# Patient Record
Sex: FEMALE | Race: WHITE | NOT HISPANIC OR LATINO | Employment: UNEMPLOYED | URBAN - METROPOLITAN AREA
[De-identification: names, ages, dates, MRNs, and addresses within clinical notes are randomized per-mention and may not be internally consistent; named-entity substitution may affect disease eponyms.]

---

## 2017-01-03 ENCOUNTER — TELEPHONE (OUTPATIENT)
Dept: MEDICAL GROUP | Facility: MEDICAL CENTER | Age: 47
End: 2017-01-03

## 2017-01-03 DIAGNOSIS — R92.8 ABNORMAL MAMMOGRAM: ICD-10-CM

## 2017-01-03 NOTE — TELEPHONE ENCOUNTER
Phone Number Called: [phone] (home)     Message: called number on file, no answer and mailbox is full.     Left Message for patient to call back: no

## 2017-01-03 NOTE — TELEPHONE ENCOUNTER
Please notify patient. There is a new order for ultrasound of the breast for further evaluation of the left breast as per mammogram result. No red flags

## 2017-01-18 ENCOUNTER — HOSPITAL ENCOUNTER (OUTPATIENT)
Dept: RADIOLOGY | Facility: MEDICAL CENTER | Age: 47
End: 2017-01-18
Attending: FAMILY MEDICINE
Payer: MEDICAID

## 2017-01-18 DIAGNOSIS — R92.8 ABNORMAL MAMMOGRAM: ICD-10-CM

## 2017-01-18 PROCEDURE — G0206 DX MAMMO INCL CAD UNI: HCPCS | Mod: LT

## 2017-01-18 PROCEDURE — 76642 ULTRASOUND BREAST LIMITED: CPT | Mod: LT

## 2017-02-02 ENCOUNTER — OFFICE VISIT (OUTPATIENT)
Dept: NEUROLOGY | Facility: MEDICAL CENTER | Age: 47
End: 2017-02-02
Payer: MEDICAID

## 2017-02-02 VITALS
HEART RATE: 85 BPM | OXYGEN SATURATION: 97 % | BODY MASS INDEX: 27.05 KG/M2 | WEIGHT: 147 LBS | RESPIRATION RATE: 16 BRPM | DIASTOLIC BLOOD PRESSURE: 72 MMHG | TEMPERATURE: 98.6 F | SYSTOLIC BLOOD PRESSURE: 108 MMHG | HEIGHT: 62 IN

## 2017-02-02 DIAGNOSIS — R42 VERTIGO: ICD-10-CM

## 2017-02-02 PROCEDURE — 99243 OFF/OP CNSLTJ NEW/EST LOW 30: CPT

## 2017-02-02 RX ORDER — ALPRAZOLAM 1 MG/1
1 TABLET ORAL
Qty: 2 TAB | Refills: 0 | Status: SHIPPED | OUTPATIENT
Start: 2017-02-02 | End: 2017-05-18

## 2017-02-02 NOTE — MR AVS SNAPSHOT
"Nubia Stapleton   2017 11:00 AM   Office Visit   MRN: [id number]    Department:  [facility]   Dept Phone:  [phone]    Description:  Female : 1970   Provider:  Karthikeyan Rose M.D.           Reason for Visit     New Patient dizziness and BUE numbness       Allergies as of 2017     Allergen Noted Reactions    Dilantin [Alc-Fd&C Yellow #6-Na Benzoate-Phenytoin] 2017   Hives    Dilaudid [Hydromorphone Hcl] 2010       Flagyl [Metronidazole] 2015       Pcn [Penicillins] 06/15/2009       Zoloft 2016       Heart palpitations      You were diagnosed with     Vertigo   [[id number]]         Vital Signs     Blood Pressure Pulse Temperature Respirations Height Weight    108/72 mmHg 85 37 °C (98.6 °F) 16 1.575 m (5' 2\") 66.679 kg (147 lb)    Body Mass Index Oxygen Saturation Last Menstrual Period Smoking Status          26.88 kg/m2 97% 2015 Former Smoker        Basic Information     Date Of Birth Sex Race Ethnicity Preferred Language    1970 Female White Non- English      Your appointments     2017  3:20 PM   Follow Up Visit with Karthikeyan Rose M.D.   [facility] (--)    [address] [place] [address], [address]  [facility] [id number] [phone]           You will be receiving a confirmation call a few days before your appointment from our automated call confirmation system.              Problem List              ICD-10-CM Priority Class Noted - Resolved    Left shoulder pain M25.512   2015 - Present    Vaginal discharge N89.8   2015 - Present    Absolute anemia (Chronic) D64.9   2015 - Present    Other injury of muscle(s) and tendon(s) of the rotator cuff of left shoulder, initial encounter S46.092A   2015 - Present    Shoulder pain, left M25.512   2015 - Present    Chronic low back pain M54.5, G89.29   2015 - Present    Anxiety disorder (Chronic) F41.9   10/1/2015 - Present    Depression (Chronic) F32.9  "  10/1/2015 - Present    Leukopenia (Chronic) D72.819   10/29/2015 - Present    Cystocele, midline N81.11   1/19/2016 - Present    Coitalgia [id number]   1/19/2016 - Present    Vaginal pain R10.2   1/19/2016 - Present    Abnormal menses N92.6   1/19/2016 - Present    Female genuine stress incontinence N39.3   1/19/2016 - Present    Status post hysterectomy Z90.710   4/19/2016 - Present    Chest pain R07.9 High  11/26/2016 - Present    Vertigo (Chronic) R42   11/27/2016 - Present    Dizziness and giddiness R42   12/8/2016 - Present    Hyperlipidemia E78.5   12/8/2016 - Present      Health Maintenance        Date Due Completion Dates    IMM DTaP/Tdap/Td Vaccine (1 - Tdap) 3/26/1988 ---    IMM INFLUENZA (1) 9/1/2016 ---    MAMMOGRAM 1/18/2018 1/18/2017, 12/30/2016, 6/1/2015, 5/27/2015    PAP SMEAR 6/19/2018 6/19/2015            Current Immunizations     No immunizations on file.      Below and/or attached are the medications your provider expects you to take. Review all of your home medications and newly ordered medications with your provider and/or pharmacist. Follow medication instructions as directed by your provider and/or pharmacist. Please keep your medication list with you and share with your provider. Update the information when medications are discontinued, doses are changed, or new medications (including over-the-counter products) are added; and carry medication information at all times in the event of emergency situations     Allergies:  DILANTIN - Hives     DILAUDID - (reactions not documented)     FLAGYL - (reactions not documented)     PCN - (reactions not documented)     ZOLOFT - (reactions not documented)               Medications  Valid as of: February 02, 2017 -  1:15 PM    Generic Name Brand Name Tablet Size Instructions for use    ALPRAZolam (Tab) XANAX 1 MG Take 1 Tab by mouth 1 time daily as needed for Sleep.        .                 Medicines prescribed today were sent to:     [id number] -  [place], [place] - [address]    [address] [place] [address]    Phone: [phone] Fax: [phone]    Open 24 Hours?: No      Medication refill instructions:       If your prescription bottle indicates you have medication refills left, it is not necessary to call your provider’s office. Please contact your pharmacy and they will refill your medication.    If your prescription bottle indicates you do not have any refills left, you may request refills at any time through one of the following ways: The online [employer] system (except Urgent Care), by calling your provider’s office, or by asking your pharmacy to contact your provider’s office with a refill request. Medication refills are processed only during regular business hours and may not be available until the next business day. Your provider may request additional information or to have a follow-up visit with you prior to refilling your medication.   *Please Note: Medication refills are assigned a new Rx number when refilled electronically. Your pharmacy may indicate that no refills were authorized even though a new prescription for the same medication is available at the pharmacy. Please request the medicine by name with the pharmacy before contacting your provider for a refill.        Your To Do List     Future Labs/Procedures Complete By Expires    MR-BRAIN IAC'S W/WO  As directed 2/2/2018      Referral     A referral request has been sent to our patient care coordination department. Please allow 3-5 business days for us to process this request and contact you either by phone or mail. If you do not hear from us by the 5th business day, please call us at [phone].        Other Notes About Your Plan     Fall risk done 5/28/15           [employer] Access Code: Activation code not generated  Current [employer] Status: Active

## 2017-02-02 NOTE — PROGRESS NOTES
Neurology Consult Note  2/2/2017      Referring MD:  Dr. Sabas Coughlin    Patient ID:  Name:             Nubia Stapleton   YOB: 1970  Age:                 46 y.o.  female   MRN:               [id number]                                              Reason for Consult:      Evaluation of vertigo and off-balance sensation.    History of Present Illness:    This 46-year-old lady has had persistent problem with off-balance sensation particularly when she rolls over in bed at night and this makes her nauseous at times. She has had a history of what sounds like migraine headache in the past but it was not associated with vertigo but was associated with some nausea. The headaches would occur every couple of months and were apparently initially denies diagnosed as cluster headaches though in fact they were bitemporal and associated with perhaps some photophobia and phonophobia. She's had an MRI without contrast in the past has been unremarkable but apparently has a family member who had an normal noncontrast MRI and was subsequently discovered to have at least metastatic tumors. She does have a history of anxiety disorder and depression and is status post hysterectomy. She is on no medications but has tried meclizine in the past with some excessive sedation.    Review of Systems: Relates to her vertigo.  Constitutional: Denies fevers, Denies weight changes  Eyes: Denies changes in vision, no eye pain  Ears/Nose/Throat/Mouth: Denies nasal congestion or sore throat   Cardiovascular: Denies chest pain, Denies palpitations   Respiratory: Denies shortness of breath , Denies cough  Gastrointestinal/Hepatic: Denies abdominal pain, nausea, vomiting, diarrhea, constipation or GI bleeding   Genitourinary: Denies dysuria or frequency  Musculoskeletal/Rheum: Denies  joint pain and swelling, No edema  Skin: Denies rash  Neurological: Vertigo and occasional migraine headaches.  Psychiatric: denies mood disorder    Endocrine: Carey thyroid problems  Heme/Oncology/Lymph Nodes: Denies enlarged lymph nodes, denies brusing or known bleeding disorder  All other systems were reviewed and are negative (AMA/CMS criteria)                Past Medical History:     Past Medical History   Diagnosis Date   • Psychiatric disorder      anxiety, PTSD,depression   • Bronchitis    • Gynecological disorder    • Heart burn    • Indigestion    • Bowel habit changes    • Dental disorder    • Snoring        Problems addressed this visit:    Problem List Items Addressed This Visit     Vertigo (Chronic)    Relevant Medications    alprazolam (XANAX) 1 MG Tab    Other Relevant Orders    MR-BRAIN IAC'S W/WO    REFERRAL TO AUDIOLOGY          Past Surgical History:   Past Surgical History   Procedure Laterality Date   • [facility]  delivery ser     • Tonsillectomy and adenoidectomy     • Other       tube in ears   • Vaginal hysterectomy scope total N/A 2016     Procedure: VAGINAL HYSTERECTOMY SCOPE TOTAL with BSO ;  Surgeon: Zeke Almeida M.D.;  Location: SURGERY SAME DAY [facility] ORS;  Service:    • Anterior and posterior repair N/A 2016     Procedure: ANTERIOR AND POSTERIOR REPAIR with entrocele repair ;  Surgeon: Zeke Almeida M.D.;  Location: SURGERY SAME DAY [facility] ORS;  Service:    • Vaginal suspension N/A 2016     Procedure: VAGINAL SUSPENSION Sacrospinous , perineoplasty ;  Surgeon: Zeke Almeida M.D.;  Location: SURGERY SAME DAY [facility] ORS;  Service:    • Bladder sling female N/A 2016     Procedure: BLADDER SLING FEMALE TOT ;  Surgeon: Zeke Almeida M.D.;  Location: SURGERY SAME DAY [facility] ORS;  Service:          Current Outpatient Medications:  Current Outpatient Prescriptions   Medication   • alprazolam (XANAX) 1 MG Tab     No current facility-administered medications for this visit.       Medication Allergy:  Allergies   Allergen Reactions   • Dilantin [Alc-Fd&C Yellow #6-Na Benzoate-Phenytoin] Hives  "  • Dilaudid [Hydromorphone Hcl]    • Flagyl [Metronidazole]    • Pcn [Penicillins]    • Zoloft      Heart palpitations       Family History:  Family History   Problem Relation Age of Onset   • Heart Disease Mother    • Hyperlipidemia Mother    • Alcohol/Drug Father    • Cancer Maternal Uncle      COLON cANCER    • Cancer Maternal Grandmother      BREAST CANCER    • Lung Disease Paternal Grandfather    • Cancer Paternal Grandfather      CANCER    • Cancer Paternal Grandmother      pancreatic cancer       Social History:  Social History     Social History   • Marital Status: Legally      Spouse Name: N/A   • Number of Children: N/A   • Years of Education: N/A     Occupational History   • Not on file.     Social History Main Topics   • Smoking status: Former Smoker   • Smokeless tobacco: Never Used   • Alcohol Use: 0.0 oz/week     0 Standard drinks or equivalent per week      Comment: SOCIAL dRINK ONCE IN a WHILE    • Drug Use: No   • Sexual Activity:     Partners: Male     Other Topics Concern   • Not on file     Social History Narrative       Physical Exam: She is a well-developed well-nourished woman who appears her stated age. Examination ears reveals normal tympanic membranes. He does appear to have a bit of post head shake nystagmus particularly to the right and on Epley maneuver is vertiginous with either ear down. Did appear to be some nystagmus when she was in her right ear down position. Strengthening of her lower extremities is normal her hearing appears to be normal reflexes are symmetric finger-nose-finger heel-to-shin testing is done well. There is no sensory deficits and position sense is intact.  Vitals:   Filed Vitals:    02/02/17 1054   BP: 108/72   Pulse: 85   Temp: 37 °C (98.6 °F)   Resp: 16   Height: 1.575 m (5' 2\")   Weight: 66.679 kg (147 lb)   SpO2: 97%     General Appearance:   Weight/BMI: Body mass index is 26.88 kg/(m^2).    Neurologic Exam:   AOx3: Normal  Recent & remote memory " intact: Yes  Attentive with normal coordination: Yes  Normal spontaneous speech pattern: Yes  Age appropriate fund of knowledge: Yes  Cranial nerve II intact: Normal  Cranial nerve III, IV & VI intact: Normal  Cranial nerve V intact: Normal  Cranial nerve VIII intact: Normal  Cranial nerve IX intact: Normal  Cranial nerve XI intact: Normal  Cranial nerve XII intact: Normal  No sensory deficits: Normal  DTRs intact & symmetrical: Normal   No dysdiadochokinesia or dysmetria: Normal    Eyes:  Normal optic discs: Yes  Visual field: Normal  Pupillary responses: Normal  Extraocular movement: Normal    Cardiovascular:  Normal carotid pulses bilaterally: Yes  RRR with no MRGs: Yes  No peripheral edema, pulses intact: Yes    Musculoskeletal:  Normal gait and station: Yes  Normal muscle strength: Yes  Normal muscle tone, no atrophy: Yes  No abnormal movements: Yes    Plan:   I'm going to get an contrast enhanced brain MRI with attention to the internal auditory canals. I will also arrange for an electronystagmogram done and see her back after that. I know she has had an attempted appointment with ear nose and throat but had she has not been contacted by them.    Total length of time of this visit was 40 minutes of which greater than 50% was spent counseling the patient/family and coordinating care.    Thank you for allowing me to participate in his care.    Sincerely yours,        Karthikeyan Rose MD, PhD

## 2017-02-16 DIAGNOSIS — R42 VERTIGO, INTERMITTENT: ICD-10-CM

## 2017-02-27 ENCOUNTER — HOSPITAL ENCOUNTER (OUTPATIENT)
Dept: RADIOLOGY | Facility: MEDICAL CENTER | Age: 47
End: 2017-02-27
Payer: MEDICAID

## 2017-02-27 DIAGNOSIS — R42 VERTIGO: ICD-10-CM

## 2017-02-27 PROCEDURE — 700117 HCHG RX CONTRAST REV CODE 255

## 2017-02-27 PROCEDURE — A9579 GAD-BASE MR CONTRAST NOS,1ML: HCPCS

## 2017-02-27 PROCEDURE — 70553 MRI BRAIN STEM W/O & W/DYE: CPT

## 2017-02-27 RX ADMIN — GADODIAMIDE 15 ML: 287 INJECTION INTRAVENOUS at 16:45

## 2017-03-08 DIAGNOSIS — R42 VERTIGO: ICD-10-CM

## 2017-05-18 ENCOUNTER — OFFICE VISIT (OUTPATIENT)
Dept: MEDICAL GROUP | Facility: MEDICAL CENTER | Age: 47
End: 2017-05-18
Attending: FAMILY MEDICINE
Payer: MEDICAID

## 2017-05-18 VITALS
HEART RATE: 84 BPM | RESPIRATION RATE: 16 BRPM | OXYGEN SATURATION: 98 % | WEIGHT: 141 LBS | TEMPERATURE: 98.2 F | HEIGHT: 62 IN | SYSTOLIC BLOOD PRESSURE: 110 MMHG | DIASTOLIC BLOOD PRESSURE: 82 MMHG | BODY MASS INDEX: 25.95 KG/M2

## 2017-05-18 DIAGNOSIS — H02.89: ICD-10-CM

## 2017-05-18 DIAGNOSIS — F41.9 ANXIETY: ICD-10-CM

## 2017-05-18 DIAGNOSIS — Z87.09 HISTORY OF ASTHMA: ICD-10-CM

## 2017-05-18 DIAGNOSIS — E07.9 THYROID MASS: ICD-10-CM

## 2017-05-18 DIAGNOSIS — R42 DIZZINESS AND GIDDINESS: ICD-10-CM

## 2017-05-18 PROCEDURE — 99212 OFFICE O/P EST SF 10 MIN: CPT | Performed by: FAMILY MEDICINE

## 2017-05-18 PROCEDURE — 99214 OFFICE O/P EST MOD 30 MIN: CPT | Performed by: FAMILY MEDICINE

## 2017-05-18 RX ORDER — ALBUTEROL SULFATE 90 UG/1
2 AEROSOL, METERED RESPIRATORY (INHALATION) EVERY 6 HOURS PRN
Qty: 1 G | Refills: 5 | Status: SHIPPED | OUTPATIENT
Start: 2017-05-18

## 2017-05-18 NOTE — PROGRESS NOTES
Chief Complaint:   Chief Complaint   Patient presents with   • Follow-Up       HPI: Established patient  Nubia Stapleton is a 47 y.o. female who presents for  follow-up on medical problems. Discussed the following concerns as follows today:     Dizziness and giddiness     This chronic dizziness and vertigo has been evaluated by ENT and neurologist, reviewed records from neurology, recent MRI results, which shows no significant abnormality and rule out any tumor or internal ear mass or tumor, patients that she continues to have the dizziness, has another appointment to follow-up with ENT and neurology. Denies deterioration or worsening of symptoms, but she said it's not resolved, she is also having an appointment for physical therapy for further management  Eyelid pain of both eyes reports having this pain in both eyes. The back of the eye for at least couple of months now, she said she has been noticing it more often, not associated with any burning sensation, there is mild decrease in visual acuity, denies redness of the eye or direct trauma, denies eye discharge. Patient said she has an appointment with eye doctor in a week for further evaluation and I encouraged her to address it at that time to check pressure. I    Thyroid mass  Patient has history of 4 mm thyroid mass without change in the function of the thyroid function test, would like to follow-up with ultrasound for further follow-up of the mass of the thyroid. Denies any symptoms suggestive of hypothyroidism or hyperthyroidism     Anxiety   Patient reports that her anxiety is doing fine. She is not on any medication at this time, denies depression or suicidal ideation or intentions to hurt self or others. She states that she has been taking care of her grandkids other keeping her busy.     History of asthma   History of asthma, patient reports that recently she had several episodes of bronchitis and she was told by urgent care and emergency room  that she possibly has asthma. She is requesting me today. to Refill albuterol inhaler for her to use it as needed for shortness of breath or symptoms of bronchitis.            Past medical history, family history, social history and medications reviewed and updated in the record. Today   Current medications, problem list and allergies reviewed in EPIC today   Health maintenance topics are reviewed and updated.    Patient Active Problem List    Diagnosis Date Noted   • Chest pain 11/26/2016     Priority: High   • Dizziness and giddiness 12/08/2016   • Hyperlipidemia 12/08/2016   • Vertigo 11/27/2016   • Status post hysterectomy 04/19/2016   • Cystocele, midline 01/19/2016   • Coitalgia 01/19/2016   • Vaginal pain 01/19/2016   • Abnormal menses 01/19/2016   • Female genuine stress incontinence 01/19/2016   • Leukopenia 10/29/2015   • Anxiety disorder 10/01/2015   • Depression 10/01/2015   • Shoulder pain, left 06/11/2015   • Chronic low back pain 06/11/2015   • Absolute anemia 05/28/2015   • Other injury of muscle(s) and tendon(s) of the rotator cuff of left shoulder, initial encounter 05/28/2015   • Left shoulder pain 05/07/2015   • Vaginal discharge 05/07/2015     Family History   Problem Relation Age of Onset   • Heart Disease Mother    • Hyperlipidemia Mother    • Alcohol/Drug Father    • Cancer Maternal Uncle      COLON cANCER    • Cancer Maternal Grandmother      BREAST CANCER    • Lung Disease Paternal Grandfather    • Cancer Paternal Grandfather      CANCER    • Cancer Paternal Grandmother      pancreatic cancer     Social History     Social History   • Marital Status: Legally      Spouse Name: N/A   • Number of Children: N/A   • Years of Education: N/A     Occupational History   • Not on file.     Social History Main Topics   • Smoking status: Former Smoker   • Smokeless tobacco: Never Used   • Alcohol Use: 0.0 oz/week     0 Standard drinks or equivalent per week      Comment: SOCIAL dRINK ONCE IN a  "WHILE    • Drug Use: No   • Sexual Activity:     Partners: Male     Other Topics Concern   • Not on file     Social History Narrative     Current Outpatient Prescriptions   Medication Sig Dispense Refill   • albuterol 108 (90 BASE) MCG/ACT Aero Soln inhalation aerosol Inhale 2 Puffs by mouth every 6 hours as needed for Shortness of Breath. 1 g 5     No current facility-administered medications for this visit.       Current Outpatient Prescriptions   Medication Sig Dispense Refill   • albuterol 108 (90 BASE) MCG/ACT Aero Soln inhalation aerosol Inhale 2 Puffs by mouth every 6 hours as needed for Shortness of Breath. 1 g 5     No current facility-administered medications for this visit.         Review Of Systems  As documented in HPI above  PHYSICAL EXAMINATION:    /82 mmHg  Pulse 84  Temp(Src) 36.8 °C (98.2 °F)  Resp 16  Ht 1.575 m (5' 2\")  Wt 63.957 kg (141 lb)  BMI 25.78 kg/m2  SpO2 98%  LMP 12/04/2015  Breastfeeding? No  Gen.: Well-developed, well-nourished, no apparent distress, pleasant and cooperative with the examination  HEENT: Normocephalic/atraumatic, sinuses nontender with palpation, TMs clear, nares patent with pink mucosa and clear rhinorrhea, oropharynx clear  Neck: No JVD or bruits, no adenopathy. Eye exam: No evidence of tenderness or abnormal eye movement, no conjunctival injection.  Cor: Regular rate and rhythm without murmur gallop or rub  Lungs: Clear to auscultation with equal breath sounds bilaterally. No wheezes, rhonchi.  Abdomen: Soft nontender without hepatosplenomegaly or masses appreciated, normoactive bowel sounds  Extremities: No cyanosis, clubbing or edema          ASSESSMENT/Plan:    1. Dizziness and giddiness  Chronic condition, advised to follow-up in neurology and ENT as directed, follow up with physical therapy for possible treatment of positional vertigo   No red flags, MRI results reviewed with the patient and discussed today.  2. Eyelid pain of both eyes  Advised " to follow-up with ophthalmology as directed, to rule out glaucoma, no red flags at this time refused to the patient if she developed any severe sudden vision loss or severe pain to report to emergency room to rule out acute angle glaucoma. No red flags at this time   I examined within normal limits  3. Thyroid mass  Recheck thyroid function. An ultrasound of the thyroid for follow-up of a previous thyroid mass. Patient is asymptomatic  - TSH; Future  - FREE THYROXINE; Future  - TRIIDOTHYRONINE; Future  - US-SOFT TISSUES OF HEAD - NECK; Future    4. Anxiety  Well controlled without medications, no concerns at this time    5. History of asthma  Refilled albuterol inhaler today.          Please note that this dictation was created using voice recognition software. I have made every reasonable attempt to correct obvious errors but there may be errors of grammar and content that I may have overlooked prior to finalization of this note.

## 2017-05-25 ENCOUNTER — HOSPITAL ENCOUNTER (OUTPATIENT)
Dept: RADIOLOGY | Facility: MEDICAL CENTER | Age: 47
End: 2017-05-25
Attending: FAMILY MEDICINE
Payer: MEDICAID

## 2017-05-25 ENCOUNTER — HOSPITAL ENCOUNTER (OUTPATIENT)
Dept: LAB | Facility: MEDICAL CENTER | Age: 47
End: 2017-05-25
Attending: FAMILY MEDICINE
Payer: MEDICAID

## 2017-05-25 DIAGNOSIS — E07.9 THYROID MASS: ICD-10-CM

## 2017-05-25 LAB
T3 SERPL-MCNC: 101.6 NG/DL (ref 60–181)
T4 FREE SERPL-MCNC: 0.81 NG/DL (ref 0.53–1.43)
TSH SERPL DL<=0.005 MIU/L-ACNC: 1.43 UIU/ML (ref 0.3–3.7)

## 2017-05-25 PROCEDURE — 84439 ASSAY OF FREE THYROXINE: CPT

## 2017-05-25 PROCEDURE — 84480 ASSAY TRIIODOTHYRONINE (T3): CPT

## 2017-05-25 PROCEDURE — 84443 ASSAY THYROID STIM HORMONE: CPT

## 2017-05-25 PROCEDURE — 36415 COLL VENOUS BLD VENIPUNCTURE: CPT

## 2017-07-26 ENCOUNTER — OFFICE VISIT (OUTPATIENT)
Dept: NEUROLOGY | Facility: MEDICAL CENTER | Age: 47
End: 2017-07-26
Payer: MEDICAID

## 2017-07-26 VITALS
TEMPERATURE: 98.8 F | BODY MASS INDEX: 25.76 KG/M2 | RESPIRATION RATE: 16 BRPM | HEIGHT: 62 IN | HEART RATE: 92 BPM | WEIGHT: 140 LBS | SYSTOLIC BLOOD PRESSURE: 112 MMHG | OXYGEN SATURATION: 99 % | DIASTOLIC BLOOD PRESSURE: 80 MMHG

## 2017-07-26 DIAGNOSIS — R42 VERTIGO, INTERMITTENT: ICD-10-CM

## 2017-07-26 PROCEDURE — 99213 OFFICE O/P EST LOW 20 MIN: CPT

## 2017-07-26 RX ORDER — DIAZEPAM 2 MG/1
2 TABLET ORAL 2 TIMES DAILY PRN
Qty: 60 TAB | Refills: 5 | Status: SHIPPED | OUTPATIENT
Start: 2017-07-26 | End: 2023-11-21

## 2017-07-26 ASSESSMENT — PATIENT HEALTH QUESTIONNAIRE - PHQ9: CLINICAL INTERPRETATION OF PHQ2 SCORE: 0

## 2017-07-26 NOTE — MR AVS SNAPSHOT
"        Nubia Stapleton   2017 11:00 AM   Office Visit   MRN: [id number]    Department:  [facility]   Dept Phone:  [phone]    Description:  Female : 1970   Provider:  Karthikeyan Rose M.D.           Reason for Visit     Follow-Up Vertigo      Allergies as of 2017     Allergen Noted Reactions    Dilantin [Alc-Fd&C Yellow #6-Na Benzoate-Phenytoin] 2017   Hives    Dilaudid [Hydromorphone Hcl] 2010       Flagyl [Metronidazole] 2015       Pcn [Penicillins] 06/15/2009       Zoloft 2016       Heart palpitations      You were diagnosed with     Vertigo, intermittent   [[id number]]         Vital Signs     Blood Pressure Pulse Temperature Respirations Height Weight    112/80 mmHg 92 37.1 °C (98.8 °F) 16 1.575 m (5' 2\") 63.504 kg (140 lb)    Body Mass Index Oxygen Saturation Last Menstrual Period Smoking Status          25.60 kg/m2 99% 2015 Former Smoker        Basic Information     Date Of Birth Sex Race Ethnicity Preferred Language    1970 Female White Non- English      Problem List              ICD-10-CM Priority Class Noted - Resolved    Left shoulder pain M25.512   2015 - Present    Vaginal discharge N89.8   2015 - Present    Absolute anemia (Chronic) D64.9   2015 - Present    Other injury of muscle(s) and tendon(s) of the rotator cuff of left shoulder, initial encounter S46.092A   2015 - Present    Shoulder pain, left M25.512   2015 - Present    Chronic low back pain M54.5, G89.29   2015 - Present    Anxiety disorder (Chronic) F41.9   10/1/2015 - Present    Depression (Chronic) F32.9   10/1/2015 - Present    Leukopenia (Chronic) D72.819   10/29/2015 - Present    Cystocele, midline N81.11   2016 - Present    Coitalgia [id number]   2016 - Present    Vaginal pain R10.2   2016 - Present    Abnormal menses N92.6   2016 - Present    Female genuine stress incontinence N39.3   2016 - Present    Status " post hysterectomy Z90.710   4/19/2016 - Present    Chest pain R07.9 High  11/26/2016 - Present    Vertigo (Chronic) R42   11/27/2016 - Present    Dizziness and giddiness R42   12/8/2016 - Present    Hyperlipidemia E78.5   12/8/2016 - Present      Health Maintenance        Date Due Completion Dates    IMM DTaP/Tdap/Td Vaccine (1 - Tdap) 3/26/1988 ---    IMM INFLUENZA (1) 9/1/2017 ---    MAMMOGRAM 1/18/2018 1/18/2017, 12/30/2016, 6/1/2015    PAP SMEAR 6/19/2018 6/19/2015            Current Immunizations     No immunizations on file.      Below and/or attached are the medications your provider expects you to take. Review all of your home medications and newly ordered medications with your provider and/or pharmacist. Follow medication instructions as directed by your provider and/or pharmacist. Please keep your medication list with you and share with your provider. Update the information when medications are discontinued, doses are changed, or new medications (including over-the-counter products) are added; and carry medication information at all times in the event of emergency situations     Allergies:  DILANTIN - Hives     DILAUDID - (reactions not documented)     FLAGYL - (reactions not documented)     PCN - (reactions not documented)     ZOLOFT - (reactions not documented)               Medications  Valid as of: July 26, 2017 - 11:38 AM    Generic Name Brand Name Tablet Size Instructions for use    Albuterol Sulfate (Aero Soln) albuterol 108 (90 BASE) MCG/ACT Inhale 2 Puffs by mouth every 6 hours as needed for Shortness of Breath.        DiazePAM (Tab) VALIUM 2 MG Take 1 Tab by mouth 2 times a day as needed for Anxiety. 1 tab BID prn vertigo        .                 Medicines prescribed today were sent to:     [facility] #[id number] - [place], [place] - [address]    [address] [place] [address]    Phone: [phone] Fax: [phone]    Open 24 Hours?: No      Medication refill instructions:       If your  prescription bottle indicates you have medication refills left, it is not necessary to call your provider’s office. Please contact your pharmacy and they will refill your medication.    If your prescription bottle indicates you do not have any refills left, you may request refills at any time through one of the following ways: The online [employer] system (except Urgent Care), by calling your provider’s office, or by asking your pharmacy to contact your provider’s office with a refill request. Medication refills are processed only during regular business hours and may not be available until the next business day. Your provider may request additional information or to have a follow-up visit with you prior to refilling your medication.   *Please Note: Medication refills are assigned a new Rx number when refilled electronically. Your pharmacy may indicate that no refills were authorized even though a new prescription for the same medication is available at the pharmacy. Please request the medicine by name with the pharmacy before contacting your provider for a refill.        Other Notes About Your Plan     Fall risk done 5/28/15           [employer] Access Code: Activation code not generated  Current [employer] Status: Active
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
## 2017-09-14 ENCOUNTER — OFFICE VISIT (OUTPATIENT)
Dept: MEDICAL GROUP | Facility: MEDICAL CENTER | Age: 47
End: 2017-09-14
Attending: FAMILY MEDICINE
Payer: MEDICAID

## 2017-09-14 VITALS
DIASTOLIC BLOOD PRESSURE: 90 MMHG | HEART RATE: 80 BPM | OXYGEN SATURATION: 99 % | HEIGHT: 62 IN | WEIGHT: 142 LBS | TEMPERATURE: 97.3 F | BODY MASS INDEX: 26.13 KG/M2 | RESPIRATION RATE: 16 BRPM | SYSTOLIC BLOOD PRESSURE: 134 MMHG

## 2017-09-14 DIAGNOSIS — G89.29 ACUTE EXACERBATION OF CHRONIC LOW BACK PAIN: ICD-10-CM

## 2017-09-14 DIAGNOSIS — M25.50 ARTHRALGIA, UNSPECIFIED JOINT: ICD-10-CM

## 2017-09-14 DIAGNOSIS — R42 VERTIGO: ICD-10-CM

## 2017-09-14 DIAGNOSIS — R53.82 CHRONIC FATIGUE: ICD-10-CM

## 2017-09-14 DIAGNOSIS — M54.50 ACUTE EXACERBATION OF CHRONIC LOW BACK PAIN: ICD-10-CM

## 2017-09-14 PROCEDURE — 99212 OFFICE O/P EST SF 10 MIN: CPT | Performed by: FAMILY MEDICINE

## 2017-09-14 PROCEDURE — 99214 OFFICE O/P EST MOD 30 MIN: CPT | Performed by: FAMILY MEDICINE

## 2017-09-14 ASSESSMENT — PAIN SCALES - GENERAL: PAINLEVEL: 5=MODERATE PAIN

## 2017-09-14 ASSESSMENT — PATIENT HEALTH QUESTIONNAIRE - PHQ9: CLINICAL INTERPRETATION OF PHQ2 SCORE: 0

## 2017-09-15 NOTE — PROGRESS NOTES
Chief Complaint:   Chief Complaint   Patient presents with   • Vertigo     seen at [facility]       HPI:Established patient   Nubia Stapleton is a 47 y.o. female who presents for Follow-up on recent hospital visit for vertigo    Vertigo  Patient has been having this chronic vertigo for a couple of years now, recently she said it's been getting worse, and she had to report to emergency room couple of days ago at [facility] for evaluation of severe episode of vertigo, she was treated with Epley maneuver and was given diazepam. Patient has been also following up with ENT specialist and neurologist for further workup of vertigo, her MRI did not show significant abnormality. She said she did not benefit much from physical therapy or by using meclizine. She said diazepam causes her a lot of side effects and makes her drowsy and she doesn't want to use it much because she takes care of her grandchildren. She drives. Today she denies vertigo at this time, but she said when she is at home, especially when she moves her head from side-to-side while lying down. She feels the episodes. She has an appointment to follow-up with her neurologist in couple of months, but she is requesting if that can be sooner, promised today that she will call her neurology office for scheduling a sooner appointment, I reviewed records from neurology, which shows that she still under workup and she needs to go back and follow-up with him for further evaluation of vertigo.     Chronic fatigue   Reports having the chronic fatigue, tiredness, and joint pain, she said these days. She has been feeling it more, denies unintentional weight loss or fever or night sweats. Denies GI symptoms    Arthralgia, unspecified joint  Reports having this chronic joint pains all over her body, associated with fatigue and tiredness, this is a chronic problem, she reports that the pain is basically in her hands and feet. Sometimes her knees. Associated sometimes  with swelling of the hands. Patient requesting workup for Lyme disease. She said she has history of possible tick bite 2014     Acute exacerbation of chronic low back pain     Reports today exacerbation of chronic back pain, she said for the past few weeks she has been feeling more low back pain associated with generalized joint pain and tiredness, she takes care of her grandchild and she carries the grandchild most of the time, especially on the car seat, which weighs around 20 pounds. Patient denies any tingling sensation or weakness of the lower extremity. Denies urinary symptoms, but she said she sometimes feels the pain in the lower abdominal area on both sides. Denies burning sensation of the urinary tract in the color. No other GI symptoms. Denies sphincter problem        Past medical history, family history, social history and medications reviewed and updated in the record. Today   Current medications, problem list and allergies reviewed in EPIC  Today   Health maintenance topics are reviewed and updated.    Patient Active Problem List    Diagnosis Date Noted   • Chest pain 11/26/2016     Priority: High   • Dizziness and giddiness 12/08/2016   • Hyperlipidemia 12/08/2016   • Vertigo 11/27/2016   • Status post hysterectomy 04/19/2016   • Cystocele, midline 01/19/2016   • Coitalgia 01/19/2016   • Vaginal pain 01/19/2016   • Abnormal menses 01/19/2016   • Female genuine stress incontinence 01/19/2016   • Leukopenia 10/29/2015   • Anxiety disorder 10/01/2015   • Depression 10/01/2015   • Shoulder pain, left 06/11/2015   • Chronic low back pain 06/11/2015   • Absolute anemia 05/28/2015   • Other injury of muscle(s) and tendon(s) of the rotator cuff of left shoulder, initial encounter 05/28/2015   • Left shoulder pain 05/07/2015   • Vaginal discharge 05/07/2015     Family History   Problem Relation Age of Onset   • Heart Disease Mother    • Hyperlipidemia Mother    • Alcohol/Drug Father    • Cancer Maternal Uncle   "    COLON cANCER    • Cancer Maternal Grandmother      BREAST CANCER    • Lung Disease Paternal Grandfather    • Cancer Paternal Grandfather      CANCER    • Cancer Paternal Grandmother      pancreatic cancer     Social History     Social History   • Marital status: Legally      Spouse name: N/A   • Number of children: N/A   • Years of education: N/A     Occupational History   • Not on file.     Social History Main Topics   • Smoking status: Former Smoker   • Smokeless tobacco: Never Used   • Alcohol use 0.0 oz/week      Comment: SOCIAL dRINK ONCE IN a WHILE    • Drug use: No   • Sexual activity: Not Currently     Partners: Male     Other Topics Concern   • Not on file     Social History Narrative   • No narrative on file     Current Outpatient Prescriptions   Medication Sig Dispense Refill   • diazepam (VALIUM) 2 MG Tab Take 1 Tab by mouth 2 times a day as needed for Anxiety. 1 tab BID prn vertigo 60 Tab 5   • albuterol 108 (90 BASE) MCG/ACT Aero Soln inhalation aerosol Inhale 2 Puffs by mouth every 6 hours as needed for Shortness of Breath. 1 g 5     No current facility-administered medications for this visit.            Review Of Systems  As documented in HPI above  PHYSICAL EXAMINATION:  Physical Exam    /90   Pulse 80   Temp 36.3 °C (97.3 °F)   Resp 16   Ht 1.575 m (5' 2\")   Wt 64.4 kg (142 lb)   LMP 01/12/2016   SpO2 99%   Breastfeeding? No   BMI 25.97 kg/m²   Gen.: Well-developed, well-nourished, no apparent distress, pleasant and cooperative with the examination  HEENT: Normocephalic/atraumatic, sinuses nontender with palpation, TMs clear, nares patent with pink mucosa and clear rhinorrhea, oropharynx clear  Neck: No JVD or bruits, no adenopathy  Cor: Regular rate and rhythm without murmur gallop or rub  Lungs: Clear to auscultation with equal breath sounds bilaterally. No wheezes, rhonchi.  Abdomen: Soft nontender without hepatosplenomegaly or masses appreciated, normoactive bowel " sounds  Extremities: No cyanosis, clubbing or edema    Generalized joint exam, no evidence of deformity or swelling at this time.      ASSESSMENT/Plan:  1. Vertigo  Chronic condition. Unresolved, advised to follow-up with neurology as directed and he is diazepam as directed by neurology, patient declines referral to physical therapy for Epley maneuver. She said it's not helping. Etiology likely seizure episodes of benign positional vertigo. Follow-up with neurology as directed.    2. Chronic fatigue    Rule out systemic arthritis. Advised to do some blood work and x-rays and come back and follow-up with me in 2-3 weeks  I will also rule out Lyme disease  CBC WITH DIFFERENTIAL    COMP METABOLIC PANEL    WESTERGREN SED RATE    HCHG LYME DISEASE ANTIBODIES    RHEUMATOID ARTHRITIS FACTOR    ANTI-NUCLEAR ANTIBODY SERUM    DX-JOINT SURVEY-FEET SINGLE VIEW    DX-JOINT SURVEY-HANDS SINGLE VIEW   3. Arthralgia, unspecified joint    Workup for systemic arthritis    CBC WITH DIFFERENTIAL    COMP METABOLIC PANEL    WESTERGREN SED RATE    HCHG LYME DISEASE ANTIBODIES    RHEUMATOID ARTHRITIS FACTOR    ANTI-NUCLEAR ANTIBODY SERUM    DX-JOINT SURVEY-FEET SINGLE VIEW    DX-JOINT SURVEY-HANDS SINGLE VIEW   4. Acute exacerbation of chronic low back pain  , Likely muscular pain. No red flags, declines physical therapy. Recheck vitamin D level, advised healthy way to take care of her back and discussed how to bend her knees while carrying her grandchild. She is speaking from the floor and avoid heavy lifting. I will check urine to rule out UTI.  Recommend over-the-counter analgesia for pain control    URINALYSIS,CULTURE IF INDICATED    VITAMIN 1,25 DIHYDROXY       Please note that this dictation was created using voice recognition software. I have made every reasonable attempt to correct obvious errors but there may be errors of grammar and content that I may have overlooked prior to finalization of this note.

## 2017-10-10 ENCOUNTER — HOSPITAL ENCOUNTER (OUTPATIENT)
Dept: RADIOLOGY | Facility: MEDICAL CENTER | Age: 47
End: 2017-10-10
Attending: FAMILY MEDICINE
Payer: MEDICAID

## 2017-10-10 ENCOUNTER — HOSPITAL ENCOUNTER (OUTPATIENT)
Dept: LAB | Facility: MEDICAL CENTER | Age: 47
End: 2017-10-10
Attending: FAMILY MEDICINE
Payer: MEDICAID

## 2017-10-10 DIAGNOSIS — M25.50 ARTHRALGIA, UNSPECIFIED JOINT: ICD-10-CM

## 2017-10-10 DIAGNOSIS — R53.82 CHRONIC FATIGUE: ICD-10-CM

## 2017-10-10 DIAGNOSIS — M54.50 ACUTE EXACERBATION OF CHRONIC LOW BACK PAIN: ICD-10-CM

## 2017-10-10 DIAGNOSIS — G89.29 ACUTE EXACERBATION OF CHRONIC LOW BACK PAIN: ICD-10-CM

## 2017-10-10 LAB
APPEARANCE UR: CLEAR
BACTERIA #/AREA URNS HPF: NEGATIVE /HPF
BILIRUB UR QL STRIP.AUTO: NEGATIVE
COLOR UR: YELLOW
CULTURE IF INDICATED INDCX: YES UA CULTURE
EPI CELLS #/AREA URNS HPF: NEGATIVE /HPF
GLUCOSE UR STRIP.AUTO-MCNC: NEGATIVE MG/DL
HYALINE CASTS #/AREA URNS LPF: NORMAL /LPF
KETONES UR STRIP.AUTO-MCNC: NEGATIVE MG/DL
LEUKOCYTE ESTERASE UR QL STRIP.AUTO: ABNORMAL
MICRO URNS: ABNORMAL
NITRITE UR QL STRIP.AUTO: NEGATIVE
PH UR STRIP.AUTO: 6 [PH]
PROT UR QL STRIP: NEGATIVE MG/DL
RBC # URNS HPF: NORMAL /HPF
RBC UR QL AUTO: NEGATIVE
SP GR UR STRIP.AUTO: 1.01
UROBILINOGEN UR STRIP.AUTO-MCNC: 0.2 MG/DL
WBC #/AREA URNS HPF: NORMAL /HPF

## 2017-10-10 PROCEDURE — 82652 VIT D 1 25-DIHYDROXY: CPT

## 2017-10-10 PROCEDURE — 81001 URINALYSIS AUTO W/SCOPE: CPT

## 2017-10-10 PROCEDURE — 80053 COMPREHEN METABOLIC PANEL: CPT

## 2017-10-10 PROCEDURE — 77077 JOINT SURVEY SINGLE VIEW: CPT

## 2017-10-10 PROCEDURE — 85025 COMPLETE CBC W/AUTO DIFF WBC: CPT

## 2017-10-10 PROCEDURE — 85652 RBC SED RATE AUTOMATED: CPT

## 2017-10-10 PROCEDURE — 36415 COLL VENOUS BLD VENIPUNCTURE: CPT

## 2017-10-10 PROCEDURE — 86431 RHEUMATOID FACTOR QUANT: CPT

## 2017-10-10 PROCEDURE — 86038 ANTINUCLEAR ANTIBODIES: CPT

## 2017-10-10 PROCEDURE — 87086 URINE CULTURE/COLONY COUNT: CPT

## 2017-10-11 LAB
ALBUMIN SERPL BCP-MCNC: 4.3 G/DL (ref 3.2–4.9)
ALBUMIN/GLOB SERPL: 1.5 G/DL
ALP SERPL-CCNC: 99 U/L (ref 30–99)
ALT SERPL-CCNC: 25 U/L (ref 2–50)
ANION GAP SERPL CALC-SCNC: 7 MMOL/L (ref 0–11.9)
AST SERPL-CCNC: 18 U/L (ref 12–45)
BASOPHILS # BLD AUTO: 0.6 % (ref 0–1.8)
BASOPHILS # BLD: 0.03 K/UL (ref 0–0.12)
BILIRUB SERPL-MCNC: 0.4 MG/DL (ref 0.1–1.5)
BUN SERPL-MCNC: 16 MG/DL (ref 8–22)
CALCIUM SERPL-MCNC: 9.9 MG/DL (ref 8.5–10.5)
CHLORIDE SERPL-SCNC: 106 MMOL/L (ref 96–112)
CO2 SERPL-SCNC: 26 MMOL/L (ref 20–33)
CREAT SERPL-MCNC: 0.71 MG/DL (ref 0.5–1.4)
EOSINOPHIL # BLD AUTO: 0.21 K/UL (ref 0–0.51)
EOSINOPHIL NFR BLD: 4.3 % (ref 0–6.9)
ERYTHROCYTE [DISTWIDTH] IN BLOOD BY AUTOMATED COUNT: 40 FL (ref 35.9–50)
ERYTHROCYTE [SEDIMENTATION RATE] IN BLOOD BY WESTERGREN METHOD: 13 MM/HOUR (ref 0–20)
GFR SERPL CREATININE-BSD FRML MDRD: >60 ML/MIN/1.73 M 2
GLOBULIN SER CALC-MCNC: 2.9 G/DL (ref 1.9–3.5)
GLUCOSE SERPL-MCNC: 94 MG/DL (ref 65–99)
HCT VFR BLD AUTO: 40.4 % (ref 37–47)
HGB BLD-MCNC: 13.6 G/DL (ref 12–16)
IMM GRANULOCYTES # BLD AUTO: 0.01 K/UL (ref 0–0.11)
IMM GRANULOCYTES NFR BLD AUTO: 0.2 % (ref 0–0.9)
LYMPHOCYTES # BLD AUTO: 1.71 K/UL (ref 1–4.8)
LYMPHOCYTES NFR BLD: 35.3 % (ref 22–41)
MCH RBC QN AUTO: 30.4 PG (ref 27–33)
MCHC RBC AUTO-ENTMCNC: 33.7 G/DL (ref 33.6–35)
MCV RBC AUTO: 90.2 FL (ref 81.4–97.8)
MONOCYTES # BLD AUTO: 0.4 K/UL (ref 0–0.85)
MONOCYTES NFR BLD AUTO: 8.3 % (ref 0–13.4)
NEUTROPHILS # BLD AUTO: 2.48 K/UL (ref 2–7.15)
NEUTROPHILS NFR BLD: 51.3 % (ref 44–72)
NRBC # BLD AUTO: 0 K/UL
NRBC BLD AUTO-RTO: 0 /100 WBC
PLATELET # BLD AUTO: 249 K/UL (ref 164–446)
PMV BLD AUTO: 10.3 FL (ref 9–12.9)
POTASSIUM SERPL-SCNC: 4 MMOL/L (ref 3.6–5.5)
PROT SERPL-MCNC: 7.2 G/DL (ref 6–8.2)
RBC # BLD AUTO: 4.48 M/UL (ref 4.2–5.4)
RHEUMATOID FACT SER IA-ACNC: <10 IU/ML (ref 0–14)
SODIUM SERPL-SCNC: 139 MMOL/L (ref 135–145)
WBC # BLD AUTO: 4.8 K/UL (ref 4.8–10.8)

## 2017-10-12 LAB
1,25(OH)2D3 SERPL-MCNC: 34.3 PG/ML (ref 19.9–79.3)
BACTERIA UR CULT: NORMAL
SIGNIFICANT IND 70042: NORMAL
SOURCE SOURCE: NORMAL

## 2017-10-13 LAB — NUCLEAR IGG SER QL IA: NORMAL

## 2017-10-19 ENCOUNTER — OFFICE VISIT (OUTPATIENT)
Dept: MEDICAL GROUP | Facility: MEDICAL CENTER | Age: 47
End: 2017-10-19
Attending: FAMILY MEDICINE
Payer: MEDICAID

## 2017-10-19 VITALS
TEMPERATURE: 97.5 F | DIASTOLIC BLOOD PRESSURE: 72 MMHG | SYSTOLIC BLOOD PRESSURE: 122 MMHG | WEIGHT: 147 LBS | HEIGHT: 62 IN | HEART RATE: 68 BPM | OXYGEN SATURATION: 100 % | BODY MASS INDEX: 27.05 KG/M2 | RESPIRATION RATE: 16 BRPM

## 2017-10-19 DIAGNOSIS — R53.83 OTHER FATIGUE: ICD-10-CM

## 2017-10-19 DIAGNOSIS — G89.4 CHRONIC PAIN SYNDROME: ICD-10-CM

## 2017-10-19 DIAGNOSIS — R06.83 SNORING: ICD-10-CM

## 2017-10-19 PROCEDURE — 99213 OFFICE O/P EST LOW 20 MIN: CPT | Performed by: FAMILY MEDICINE

## 2017-10-19 RX ORDER — CITALOPRAM HYDROBROMIDE 10 MG/1
10 TABLET ORAL DAILY
Qty: 30 TAB | Refills: 3 | Status: SHIPPED | OUTPATIENT
Start: 2017-10-19 | End: 2023-11-21

## 2017-10-19 NOTE — PROGRESS NOTES
Chief Complaint:   Chief Complaint   Patient presents with   • Follow-Up   • Results       HPI:Established patient  Nubia Stapleton is a 47 y.o. female who presents for follow-up on her lab work results and the chronic fatigue and pain that she has been experiencing for a long time now      I reviewed with the patient. All her lab work results and x-ray results today, all within normal limits without significant findings, negative for systemic arthritis workup x-rays within normal limits without significant abnormality. Discussed the following    Chronic pain syndrome   Discussed with the patient that the chronic pain in her joints and fatigue and tiredness might be related to chronic pain or fatigue syndrome or depression, discussed starting her on low-dose antidepressant to see if she can see any improvement and improvement in her lifestyle. Patient said she tried Zoloft, Prozac, Paxil in the past and she had a lot of side effects, she said the only medication that she had good effect on was Celexa yet. She was concerned that she stopped it because she felt that it might cause her some visual disturbance and visual loss, patient is willing to retry Celexa to see if that can help her symptoms    Other fatigue     Reports low energy, fatigue and tiredness all the time with joint pains and generalized body pain. Reviewed all her lab work results and x-rays today. No significant abnormality. Patient denies unintentional weight loss or fever or night sweats   Snoring  Reports that she thinks he might have sleep apnea because she snores a lot at night feeling very tired during the daytime and sleepy, she also reports that she wakes up during the nighttime hungry for air along time. Would like to make sure that she does not have any sleep apnea          Past medical history, family history, social history and medications reviewed and updated in the record. Today   Current medications, problem list and allergies  reviewed in [facility] today   Health maintenance topics are reviewed and updated. Today , declines flu vaccine. Does not want to take it    Patient Active Problem List    Diagnosis Date Noted   • Chest pain 11/26/2016     Priority: High   • Dizziness and giddiness 12/08/2016   • Hyperlipidemia 12/08/2016   • Vertigo 11/27/2016   • Status post hysterectomy 04/19/2016   • Cystocele, midline 01/19/2016   • Coitalgia 01/19/2016   • Vaginal pain 01/19/2016   • Abnormal menses 01/19/2016   • Female genuine stress incontinence 01/19/2016   • Leukopenia 10/29/2015   • Anxiety disorder 10/01/2015   • Depression 10/01/2015   • Shoulder pain, left 06/11/2015   • Chronic low back pain 06/11/2015   • Absolute anemia 05/28/2015   • Other injury of muscle(s) and tendon(s) of the rotator cuff of left shoulder, initial encounter 05/28/2015   • Left shoulder pain 05/07/2015   • Vaginal discharge 05/07/2015     Family History   Problem Relation Age of Onset   • Heart Disease Mother    • Hyperlipidemia Mother    • Alcohol/Drug Father    • Cancer Maternal Uncle      COLON cANCER    • Cancer Maternal Grandmother      BREAST CANCER    • Lung Disease Paternal Grandfather    • Cancer Paternal Grandfather      CANCER    • Cancer Paternal Grandmother      pancreatic cancer     Social History     Social History   • Marital status: Legally      Spouse name: N/A   • Number of children: N/A   • Years of education: N/A     Occupational History   • Not on file.     Social History Main Topics   • Smoking status: Former Smoker   • Smokeless tobacco: Never Used   • Alcohol use 0.0 oz/week      Comment: SOCIAL dRINK ONCE IN a WHILE    • Drug use: No   • Sexual activity: Not Currently     Partners: Male     Other Topics Concern   • Not on file     Social History Narrative   • No narrative on file     Current Outpatient Prescriptions   Medication Sig Dispense Refill   • citalopram (CELEXA) 10 MG tablet Take 1 Tab by mouth every day. 30 Tab 3   •  "diazepam (VALIUM) 2 MG Tab Take 1 Tab by mouth 2 times a day as needed for Anxiety. 1 tab BID prn vertigo 60 Tab 5   • albuterol 108 (90 BASE) MCG/ACT Aero Soln inhalation aerosol Inhale 2 Puffs by mouth every 6 hours as needed for Shortness of Breath. 1 g 5     No current facility-administered medications for this visit.            Review Of Systems  As documented in HPI above  PHYSICAL EXAMINATION:  Physical Exam        /72   Pulse 68   Temp 36.4 °C (97.5 °F)   Resp 16   Ht 1.575 m (5' 2.01\")   Wt 66.7 kg (147 lb)   LMP 01/12/2016   SpO2 100%   Breastfeeding? No   BMI 26.88 kg/m²   Gen.: Well-developed, well-nourished, no apparent distress, pleasant and cooperative with the examination  HEENT: Normocephalic/atraumatic  Extremities: No cyanosis, clubbing or edema          ASSESSMENT/Plan:  1. Chronic pain syndrome    Discussed with the patient to start her on low-dose Celexa to see the effect on that on her chronic fatigue and chronic pain syndrome, patient agrees, advised to follow-up in 2 weeks to see if she develops any side effects, and follow-up with her symptoms    citalopram (CELEXA) 10 MG tablet   2. Other fatigue  REFERRAL TO SLEEP STUDIES    citalopram (CELEXA) 10 MG tablet   3. Snoring  , Rule out sleep problems and disturbance as mentioned above    REFERRAL TO SLEEP STUDIES       Please note that this dictation was created using voice recognition software. I have made every reasonable attempt to correct obvious errors but there may be errors of grammar and content that I may have overlooked prior to finalization of this note.       "

## 2018-01-03 ENCOUNTER — APPOINTMENT (OUTPATIENT)
Dept: NEUROLOGY | Facility: MEDICAL CENTER | Age: 48
End: 2018-01-03
Payer: MEDICAID

## 2023-08-24 ENCOUNTER — PATIENT MESSAGE (OUTPATIENT)
Dept: HEALTH INFORMATION MANAGEMENT | Facility: OTHER | Age: 53
End: 2023-08-24

## 2023-11-17 ENCOUNTER — APPOINTMENT (OUTPATIENT)
Dept: ADMISSIONS | Facility: MEDICAL CENTER | Age: 53
End: 2023-11-17
Attending: SURGERY
Payer: MEDICAID

## 2023-11-21 ENCOUNTER — PRE-ADMISSION TESTING (OUTPATIENT)
Dept: ADMISSIONS | Facility: MEDICAL CENTER | Age: 53
End: 2023-11-21
Attending: SURGERY
Payer: MEDICAID

## 2023-11-21 RX ORDER — LORATADINE 10 MG/1
10 TABLET ORAL DAILY
COMMUNITY

## 2023-11-21 RX ORDER — IBUPROFEN 200 MG
400 TABLET ORAL PRN
COMMUNITY

## 2023-11-21 RX ORDER — ONDANSETRON 4 MG/1
4 TABLET, ORALLY DISINTEGRATING ORAL PRN
COMMUNITY

## 2023-11-28 ENCOUNTER — ANESTHESIA EVENT (OUTPATIENT)
Dept: SURGERY | Facility: MEDICAL CENTER | Age: 53
End: 2023-11-28
Payer: MEDICAID

## 2023-11-29 ENCOUNTER — PHARMACY VISIT (OUTPATIENT)
Dept: PHARMACY | Facility: MEDICAL CENTER | Age: 53
End: 2023-11-29
Payer: COMMERCIAL

## 2023-11-29 ENCOUNTER — HOSPITAL ENCOUNTER (OUTPATIENT)
Facility: MEDICAL CENTER | Age: 53
End: 2023-11-29
Attending: SURGERY | Admitting: SURGERY
Payer: MEDICAID

## 2023-11-29 ENCOUNTER — ANESTHESIA (OUTPATIENT)
Dept: SURGERY | Facility: MEDICAL CENTER | Age: 53
End: 2023-11-29
Payer: MEDICAID

## 2023-11-29 VITALS
DIASTOLIC BLOOD PRESSURE: 73 MMHG | RESPIRATION RATE: 16 BRPM | HEIGHT: 62 IN | BODY MASS INDEX: 31.68 KG/M2 | SYSTOLIC BLOOD PRESSURE: 134 MMHG | WEIGHT: 172.18 LBS | TEMPERATURE: 97.8 F | HEART RATE: 80 BPM | OXYGEN SATURATION: 96 %

## 2023-11-29 DIAGNOSIS — G89.18 POSTOPERATIVE PAIN: ICD-10-CM

## 2023-11-29 LAB
ANION GAP SERPL CALC-SCNC: 10 MMOL/L (ref 7–16)
BUN SERPL-MCNC: 12 MG/DL (ref 8–22)
CALCIUM SERPL-MCNC: 9.7 MG/DL (ref 8.5–10.5)
CHLORIDE SERPL-SCNC: 105 MMOL/L (ref 96–112)
CO2 SERPL-SCNC: 25 MMOL/L (ref 20–33)
CREAT SERPL-MCNC: 0.93 MG/DL (ref 0.5–1.4)
EKG IMPRESSION: NORMAL
ERYTHROCYTE [DISTWIDTH] IN BLOOD BY AUTOMATED COUNT: 43 FL (ref 35.9–50)
GFR SERPLBLD CREATININE-BSD FMLA CKD-EPI: 73 ML/MIN/1.73 M 2
GLUCOSE SERPL-MCNC: 83 MG/DL (ref 65–99)
HCT VFR BLD AUTO: 42.8 % (ref 37–47)
HGB BLD-MCNC: 14.3 G/DL (ref 12–16)
MCH RBC QN AUTO: 31.5 PG (ref 27–33)
MCHC RBC AUTO-ENTMCNC: 33.4 G/DL (ref 32.2–35.5)
MCV RBC AUTO: 94.3 FL (ref 81.4–97.8)
PATHOLOGY CONSULT NOTE: NORMAL
PLATELET # BLD AUTO: 239 K/UL (ref 164–446)
PMV BLD AUTO: 10 FL (ref 9–12.9)
POTASSIUM SERPL-SCNC: 4.1 MMOL/L (ref 3.6–5.5)
RBC # BLD AUTO: 4.54 M/UL (ref 4.2–5.4)
SODIUM SERPL-SCNC: 140 MMOL/L (ref 135–145)
WBC # BLD AUTO: 4.9 K/UL (ref 4.8–10.8)

## 2023-11-29 PROCEDURE — 160041 HCHG SURGERY MINUTES - EA ADDL 1 MIN LEVEL 4: Performed by: SURGERY

## 2023-11-29 PROCEDURE — 700105 HCHG RX REV CODE 258: Mod: UD | Performed by: SURGERY

## 2023-11-29 PROCEDURE — 160002 HCHG RECOVERY MINUTES (STAT): Performed by: SURGERY

## 2023-11-29 PROCEDURE — 700101 HCHG RX REV CODE 250: Mod: UD | Performed by: SURGERY

## 2023-11-29 PROCEDURE — 700101 HCHG RX REV CODE 250: Mod: UD | Performed by: ANESTHESIOLOGY

## 2023-11-29 PROCEDURE — A9270 NON-COVERED ITEM OR SERVICE: HCPCS | Mod: UD | Performed by: ANESTHESIOLOGY

## 2023-11-29 PROCEDURE — 160047 HCHG PACU  - EA ADDL 30 MINS PHASE II: Performed by: SURGERY

## 2023-11-29 PROCEDURE — 93010 ELECTROCARDIOGRAM REPORT: CPT | Performed by: INTERNAL MEDICINE

## 2023-11-29 PROCEDURE — 160035 HCHG PACU - 1ST 60 MINS PHASE I: Performed by: SURGERY

## 2023-11-29 PROCEDURE — 93005 ELECTROCARDIOGRAM TRACING: CPT | Performed by: SURGERY

## 2023-11-29 PROCEDURE — 160025 RECOVERY II MINUTES (STATS): Performed by: SURGERY

## 2023-11-29 PROCEDURE — 700111 HCHG RX REV CODE 636 W/ 250 OVERRIDE (IP): Mod: UD | Performed by: ANESTHESIOLOGY

## 2023-11-29 PROCEDURE — 160009 HCHG ANES TIME/MIN: Performed by: SURGERY

## 2023-11-29 PROCEDURE — 700111 HCHG RX REV CODE 636 W/ 250 OVERRIDE (IP): Mod: JZ,UD | Performed by: ANESTHESIOLOGY

## 2023-11-29 PROCEDURE — 88304 TISSUE EXAM BY PATHOLOGIST: CPT

## 2023-11-29 PROCEDURE — 700102 HCHG RX REV CODE 250 W/ 637 OVERRIDE(OP): Mod: UD | Performed by: ANESTHESIOLOGY

## 2023-11-29 PROCEDURE — 160048 HCHG OR STATISTICAL LEVEL 1-5: Performed by: SURGERY

## 2023-11-29 PROCEDURE — RXMED WILLOW AMBULATORY MEDICATION CHARGE: Performed by: SURGERY

## 2023-11-29 PROCEDURE — 85027 COMPLETE CBC AUTOMATED: CPT

## 2023-11-29 PROCEDURE — 80048 BASIC METABOLIC PNL TOTAL CA: CPT

## 2023-11-29 PROCEDURE — 160046 HCHG PACU - 1ST 60 MINS PHASE II: Performed by: SURGERY

## 2023-11-29 PROCEDURE — 160029 HCHG SURGERY MINUTES - 1ST 30 MINS LEVEL 4: Performed by: SURGERY

## 2023-11-29 RX ORDER — MIDAZOLAM HYDROCHLORIDE 1 MG/ML
INJECTION INTRAMUSCULAR; INTRAVENOUS PRN
Status: DISCONTINUED | OUTPATIENT
Start: 2023-11-29 | End: 2023-11-29 | Stop reason: SURG

## 2023-11-29 RX ORDER — BUPIVACAINE HYDROCHLORIDE AND EPINEPHRINE 5; 5 MG/ML; UG/ML
INJECTION, SOLUTION EPIDURAL; INTRACAUDAL; PERINEURAL
Status: DISCONTINUED
Start: 2023-11-29 | End: 2023-11-29 | Stop reason: HOSPADM

## 2023-11-29 RX ORDER — SODIUM CHLORIDE, SODIUM LACTATE, POTASSIUM CHLORIDE, CALCIUM CHLORIDE 600; 310; 30; 20 MG/100ML; MG/100ML; MG/100ML; MG/100ML
INJECTION, SOLUTION INTRAVENOUS CONTINUOUS
Status: DISCONTINUED | OUTPATIENT
Start: 2023-11-29 | End: 2023-11-29 | Stop reason: HOSPADM

## 2023-11-29 RX ORDER — DEXAMETHASONE SODIUM PHOSPHATE 4 MG/ML
INJECTION, SOLUTION INTRA-ARTICULAR; INTRALESIONAL; INTRAMUSCULAR; INTRAVENOUS; SOFT TISSUE PRN
Status: DISCONTINUED | OUTPATIENT
Start: 2023-11-29 | End: 2023-11-29 | Stop reason: SURG

## 2023-11-29 RX ORDER — EPHEDRINE SULFATE 50 MG/ML
5 INJECTION, SOLUTION INTRAVENOUS
Status: DISCONTINUED | OUTPATIENT
Start: 2023-11-29 | End: 2023-11-29 | Stop reason: HOSPADM

## 2023-11-29 RX ORDER — HALOPERIDOL 5 MG/ML
1 INJECTION INTRAMUSCULAR
Status: DISCONTINUED | OUTPATIENT
Start: 2023-11-29 | End: 2023-11-29 | Stop reason: HOSPADM

## 2023-11-29 RX ORDER — CLINDAMYCIN PHOSPHATE 900 MG/50ML
INJECTION, SOLUTION INTRAVENOUS
Status: DISCONTINUED
Start: 2023-11-29 | End: 2023-11-29 | Stop reason: HOSPADM

## 2023-11-29 RX ORDER — ALBUTEROL SULFATE 2.5 MG/3ML
2.5 SOLUTION RESPIRATORY (INHALATION)
Status: DISCONTINUED | OUTPATIENT
Start: 2023-11-29 | End: 2023-11-29 | Stop reason: HOSPADM

## 2023-11-29 RX ORDER — ACETAMINOPHEN 500 MG
1000 TABLET ORAL ONCE
Status: COMPLETED | OUTPATIENT
Start: 2023-11-29 | End: 2023-11-29

## 2023-11-29 RX ORDER — OXYCODONE HCL 5 MG/5 ML
10 SOLUTION, ORAL ORAL
Status: COMPLETED | OUTPATIENT
Start: 2023-11-29 | End: 2023-11-29

## 2023-11-29 RX ORDER — CEFAZOLIN SODIUM 1 G/3ML
INJECTION, POWDER, FOR SOLUTION INTRAMUSCULAR; INTRAVENOUS PRN
Status: DISCONTINUED | OUTPATIENT
Start: 2023-11-29 | End: 2023-11-29 | Stop reason: SURG

## 2023-11-29 RX ORDER — PHENYLEPHRINE HCL IN 0.9% NACL 0.5 MG/5ML
SYRINGE (ML) INTRAVENOUS PRN
Status: DISCONTINUED | OUTPATIENT
Start: 2023-11-29 | End: 2023-11-29 | Stop reason: SURG

## 2023-11-29 RX ORDER — HYDROCODONE BITARTRATE AND ACETAMINOPHEN 5; 325 MG/1; MG/1
1-2 TABLET ORAL EVERY 6 HOURS PRN
Qty: 30 TABLET | Refills: 0 | Status: SHIPPED | OUTPATIENT
Start: 2023-11-29 | End: 2023-12-06

## 2023-11-29 RX ORDER — BUPIVACAINE HYDROCHLORIDE AND EPINEPHRINE 5; 5 MG/ML; UG/ML
INJECTION, SOLUTION PERINEURAL
Status: DISCONTINUED | OUTPATIENT
Start: 2023-11-29 | End: 2023-11-29 | Stop reason: HOSPADM

## 2023-11-29 RX ORDER — LIDOCAINE HYDROCHLORIDE 20 MG/ML
INJECTION, SOLUTION EPIDURAL; INFILTRATION; INTRACAUDAL; PERINEURAL PRN
Status: DISCONTINUED | OUTPATIENT
Start: 2023-11-29 | End: 2023-11-29 | Stop reason: SURG

## 2023-11-29 RX ORDER — LIDOCAINE HYDROCHLORIDE 40 MG/ML
SOLUTION TOPICAL PRN
Status: DISCONTINUED | OUTPATIENT
Start: 2023-11-29 | End: 2023-11-29 | Stop reason: HOSPADM

## 2023-11-29 RX ORDER — CELECOXIB 200 MG/1
200 CAPSULE ORAL ONCE
Status: COMPLETED | OUTPATIENT
Start: 2023-11-29 | End: 2023-11-29

## 2023-11-29 RX ORDER — ROCURONIUM BROMIDE 10 MG/ML
INJECTION, SOLUTION INTRAVENOUS PRN
Status: DISCONTINUED | OUTPATIENT
Start: 2023-11-29 | End: 2023-11-29 | Stop reason: SURG

## 2023-11-29 RX ORDER — LABETALOL HYDROCHLORIDE 5 MG/ML
5 INJECTION, SOLUTION INTRAVENOUS
Status: DISCONTINUED | OUTPATIENT
Start: 2023-11-29 | End: 2023-11-29 | Stop reason: HOSPADM

## 2023-11-29 RX ORDER — OXYCODONE HCL 5 MG/5 ML
5 SOLUTION, ORAL ORAL
Status: COMPLETED | OUTPATIENT
Start: 2023-11-29 | End: 2023-11-29

## 2023-11-29 RX ORDER — ONDANSETRON 2 MG/ML
INJECTION INTRAMUSCULAR; INTRAVENOUS PRN
Status: DISCONTINUED | OUTPATIENT
Start: 2023-11-29 | End: 2023-11-29 | Stop reason: SURG

## 2023-11-29 RX ORDER — HYDRALAZINE HYDROCHLORIDE 20 MG/ML
5 INJECTION INTRAMUSCULAR; INTRAVENOUS
Status: DISCONTINUED | OUTPATIENT
Start: 2023-11-29 | End: 2023-11-29 | Stop reason: HOSPADM

## 2023-11-29 RX ORDER — DIPHENHYDRAMINE HYDROCHLORIDE 50 MG/ML
12.5 INJECTION INTRAMUSCULAR; INTRAVENOUS
Status: DISCONTINUED | OUTPATIENT
Start: 2023-11-29 | End: 2023-11-29 | Stop reason: HOSPADM

## 2023-11-29 RX ORDER — ONDANSETRON 2 MG/ML
4 INJECTION INTRAMUSCULAR; INTRAVENOUS
Status: COMPLETED | OUTPATIENT
Start: 2023-11-29 | End: 2023-11-29

## 2023-11-29 RX ADMIN — FENTANYL CITRATE 25 MCG: 50 INJECTION, SOLUTION INTRAMUSCULAR; INTRAVENOUS at 15:00

## 2023-11-29 RX ADMIN — ACETAMINOPHEN 1000 MG: 500 TABLET ORAL at 11:40

## 2023-11-29 RX ADMIN — LIDOCAINE HYDROCHLORIDE 4 ML: 40 SOLUTION TOPICAL at 13:57

## 2023-11-29 RX ADMIN — ONDANSETRON 4 MG: 2 INJECTION INTRAMUSCULAR; INTRAVENOUS at 14:54

## 2023-11-29 RX ADMIN — FENTANYL CITRATE 50 MCG: 50 INJECTION, SOLUTION INTRAMUSCULAR; INTRAVENOUS at 14:09

## 2023-11-29 RX ADMIN — PROPOFOL 150 MG: 10 INJECTION, EMULSION INTRAVENOUS at 13:57

## 2023-11-29 RX ADMIN — ONDANSETRON 4 MG: 2 INJECTION INTRAMUSCULAR; INTRAVENOUS at 14:17

## 2023-11-29 RX ADMIN — FENTANYL CITRATE 50 MCG: 50 INJECTION, SOLUTION INTRAMUSCULAR; INTRAVENOUS at 14:39

## 2023-11-29 RX ADMIN — FENTANYL CITRATE 25 MCG: 50 INJECTION, SOLUTION INTRAMUSCULAR; INTRAVENOUS at 14:53

## 2023-11-29 RX ADMIN — Medication 100 MCG: at 14:01

## 2023-11-29 RX ADMIN — CELECOXIB 200 MG: 200 CAPSULE ORAL at 11:40

## 2023-11-29 RX ADMIN — DEXAMETHASONE SODIUM PHOSPHATE 8 MG: 4 INJECTION INTRA-ARTICULAR; INTRALESIONAL; INTRAMUSCULAR; INTRAVENOUS; SOFT TISSUE at 14:02

## 2023-11-29 RX ADMIN — MIDAZOLAM HYDROCHLORIDE 2 MG: 1 INJECTION, SOLUTION INTRAMUSCULAR; INTRAVENOUS at 13:50

## 2023-11-29 RX ADMIN — SUGAMMADEX 200 MG: 100 INJECTION, SOLUTION INTRAVENOUS at 14:22

## 2023-11-29 RX ADMIN — SODIUM CHLORIDE, POTASSIUM CHLORIDE, SODIUM LACTATE AND CALCIUM CHLORIDE: 600; 310; 30; 20 INJECTION, SOLUTION INTRAVENOUS at 12:08

## 2023-11-29 RX ADMIN — ROCURONIUM BROMIDE 50 MG: 50 INJECTION, SOLUTION INTRAVENOUS at 13:57

## 2023-11-29 RX ADMIN — FENTANYL CITRATE 100 MCG: 50 INJECTION, SOLUTION INTRAMUSCULAR; INTRAVENOUS at 13:55

## 2023-11-29 RX ADMIN — CEFAZOLIN 2 G: 1 INJECTION, POWDER, FOR SOLUTION INTRAMUSCULAR; INTRAVENOUS at 13:58

## 2023-11-29 RX ADMIN — LIDOCAINE HYDROCHLORIDE 3 ML: 20 INJECTION, SOLUTION EPIDURAL; INFILTRATION; INTRACAUDAL at 13:57

## 2023-11-29 RX ADMIN — OXYCODONE HYDROCHLORIDE 10 MG: 5 SOLUTION ORAL at 14:50

## 2023-11-29 ASSESSMENT — FIBROSIS 4 INDEX
FIB4 SCORE: 0.91
FIB4 SCORE: 0.91

## 2023-11-29 ASSESSMENT — PAIN DESCRIPTION - PAIN TYPE
TYPE: SURGICAL PAIN

## 2023-11-29 NOTE — ANESTHESIA PROCEDURE NOTES
Airway    Date/Time: 11/29/2023 1:57 PM    Performed by: Hermes Saini M.D.  Authorized by: Hermes Saini M.D.    Location:  OR  Urgency:  Elective  Difficult Airway: No    Indications for Airway Management:  Anesthesia      Spontaneous Ventilation: absent    Sedation Level:  Deep  Preoxygenated: Yes    Patient Position:  Sniffing  Mask Difficulty Assessment:  2 - vent by mask + OA or adjuvant +/- NMBA  Final Airway Type:  Endotracheal airway  Final Endotracheal Airway:  ETT  Cuffed: Yes    Technique Used for Successful ETT Placement:  Direct laryngoscopy    Insertion Site:  Oral  Blade Type:  Stella  Laryngoscope Blade/Videolaryngoscope Blade Size:  3  ETT Size (mm):  6.5  Measured from:  Teeth  ETT to Teeth (cm):  21  Placement Verified by: auscultation and capnometry    Cormack-Lehane Classification:  Grade I - full view of glottis  Number of Attempts at Approach:  1   Atraumatic DLx1

## 2023-11-29 NOTE — PROGRESS NOTES
Laparoscopic Cholecystectomy D/C instructions:    1. DIET: Upon discharge from the hospital you may resume your normal preoperative diet. Depending on how you are feeling and whether you have nausea or not, you may wish to stay with a bland diet for the first few days. However, you can advance this as quickly as you feel ready.    2. ACTIVITIES: After discharge from the hospital, you may resume full routine activities. However, there should be no heavy lifting (greater than 15 pounds) and no strenuous activities until after your follow-up visit. Otherwise, routine activities of daily living are acceptable.    3. DRIVING: You may drive whenever you are off pain medications and are able to perform the activities needed to drive, i.e. turning, bending, twisting, etc.    4. BATHING: You may get the wound wet at any time after leaving the hospital. You may shower, but do not submerge in a bath for at least a week.     5. BOWEL FUNCTION: A few patients, after this operation, will develop either frequent or loose stools after meals. This usually corrects itself after a few days, to a few weeks. If this occurs, do not worry; it is not unusual and will resolve. Much more common than loose stools, is constipation. The combination of pain medication and decreased activity level can cause constipation in otherwise normal patients. If you feel this is occurring, take a laxative (Milk of Magnesia, Ex-Lax, Senokot, etc.) until the problem has resolved.    6. PAIN MEDICATION: You will be given a prescription for pain medication at discharge. Please take these as directed. It is important to remember not to take medications on an empty stomach as this may cause nausea.     It is also helpful to take other non-narcotic over the counter medications to help with pain control and to decrease the need for narcotic medications. I recommend ibuprofen, taken every 8 hours, dosing as directed on the medication bottle.    It is useful to  slowly decrease the number of narcotic pain medications you take starting the first day after surgery, as you are able. If you need a refill, Dr. Linares's office will provide you with one refill at your post-operative visit. After this, no more narcotic pain medications will be given.     7.CALL IF YOU HAVE: (1) Fevers to more than 1010 F, (2) Unusual chest or leg pain, (3) Drainage or fluid from incision that may be foul smelling, increased tenderness or soreness at the wound or the wound edges are no longer together, redness or swelling at the incision site. Please do not hesitate to call with any other questions.     8. APPOINTMENT: Contact our office at [phone] for a follow-up appointment in 1 to 2 weeks following your procedure.    If you have any additional questions, please do not hesitate to call the office and speak to either myself or the physician on call.    Office address:  [address] [place] [address] [id number]  [place] [id number]    Tamanna Linares M.D.  [facility]  [phone]

## 2023-11-29 NOTE — ANESTHESIA PREPROCEDURE EVALUATION
Case: [id number] Date/Time: 11/29/23 1315    Procedure: LAPAROSCOPIC CHOLECYSTECTOMY    Pre-op diagnosis: CHRONIC CHOLECYSTITIS WITH CALCULUS    Location: [facility] ROOM 23 / SURGERY SAME DAY [facility]    Surgeons: Tamanna Linares M.D.            Relevant Problems   Other   (positive) Anxiety disorder   (positive) Depression   (positive) Hyperlipidemia   Asthma mild    Physical Exam    Airway   Mallampati: II  TM distance: >3 FB  Neck ROM: full       Cardiovascular - normal exam  Rhythm: regular  Rate: normal  (-) murmur     Dental - normal exam  (+) upper dentures           Pulmonary - normal exam  Breath sounds clear to auscultation     Abdominal    Neurological - normal exam                   Anesthesia Plan    ASA 2       Plan - general       Airway plan will be ETT          Induction: intravenous    Postoperative Plan: Postoperative administration of opioids is intended.    Pertinent diagnostic labs and testing reviewed    Informed Consent:    Anesthetic plan and risks discussed with patient.    Use of blood products discussed with: patient whom consented to blood products.

## 2023-11-29 NOTE — OP REPORT
Operative Report    Date: 11/29/2023    Surgeon: Tamanna Linares M.D.    Assistant: Jerel Duke M.D.    My assistant was medically necessary for this procedure. She ran the laparoscope for the duration of the procedure, neccessary for my visualization of the surgical field. He also retracted the gallbladder cephalad and laterally, in order to facilitate my visualization of the hepatoduodenal ligament and critical view of safety. Finally, he also assisted in achievement of hemostasis and wound closure.     Anesthesiologist: Iliana MINOR    Preoperative Diagnosis: K80.10 Calculus of gallbladder with chronic cholecystitis without obstruction    Postoperative Diagnosis: same    Procedure Performed: 08311 Laparoscopy, surgical; cholecystectomy    Indications: This is a 53 y.o. female who presented with abdominal pain. History, physical and diagnostic studies are consistent with chronic cholecystitis.    An extensive procedures, alternative, risks and questions conference was held with the patient and her family, in regard to the surgical treatment of cholecystitis. The patient was counseled regarding the benefits of the operation, namely, removal of gallbladder and alleviation of her infection and symptoms. The patient was made aware of the alternatives, including operative and non-operative management. The risks of bleeding, infection, damage to surrounding structures, need for reoperation, need for open operation, bile duct injury, stroke, MI, and death were discussed with the patient. The patient was given a chance to ask questions, and all her questions were answered. Discussion was undertaken with Layman's terms. The patient and family demonstrated adequate understanding, seemed pleased with the plan, and wish to proceed. Consent was given in clear state of mind.  Consent was signed.     Findings: chronic cholecystitis    Procedure in detail: The patient was brought to the operating room and was placed in the supine  position where general endotracheal anesthesia was induced. SCDs were in place and functioning. Preoperative antibiotics of ancef were given before incision time. The patient's abdomen was prepped and draped in the usual sterile fashion.    After infiltration of local anesthetic, a skin incision was made to accommodate a 5 mm port supra-umbilically. We then used the Veress needle to access the peritoneum, and after saline drop test, we insufflated to 15 mmHg. We then placed the 5mm 30 degree camera and inspected the abdomen for evidence of trauma secondary to Veress needle or port placement, and found none.    Utilizing the 30-degree laparoscope with the patient in the reverse Trendelenburg position, and after infiltration of local anesthetic, an additional 11-mm port was inserted into the epigastrium just to the right of the midline. Then two 5-mm ports were inserted in the midclavicular and anterior axillary lines, in the right upper quadrant, all under direct visualization.    The gallbladder was identified, it appeared chronically acarred. The gallbladder was retracted cephalad and caudally using gallbladder graspers. Using the [place], we were able to peel the overlying peritoneum off the cystic duct, and circumferentially dissect it. We used electrocautery to futher remove the peritoneum off the gallbladder. We then were able to further dissect Calot's triangle until we identified the cystic artery and cystic ducts, which were circumferrentially dissected. A critical view of safety was obtained.    We then doubly clipped the cystic duct distally and divided it. We then doubly clipped the cystic artery and divided it.Then, using electrocautery, we removed the gallbladder from the liver bed. After ensuring gallbladder bed hemostasis with cautery, we removed the gallbladder and placed it in an endocatch bag, and removed it from the epigastric port site.    The right upper quadrant was irrigated copiously with  normal saline solution. We inspected the cystic duct and artery stumps, and found them to be intact. The liver bed was hemostatic.    The trocars were removed under direct visualization.     The skin of all incisions was closed with subcuticular suture.    All sponge, needle, and instrument counts were reported as correct at the end of the procedure. The patient tolerated the procedure well and left the operating room for the recovery room in stable and satisfactory condition.    Estimated Blood Loss: minimal     Specimens: gallbladder for permanent pathology    Complications: none apparent     Drains: none     Disposition: stable, extubated, to PACU    Tamanna Linares M.D.  [facility]  [phone]

## 2023-11-29 NOTE — DISCHARGE INSTRUCTIONS
Laparoscopic Cholecystectomy D/C instructions:     1. DIET: Upon discharge from the hospital you may resume your normal preoperative diet. Depending on how you are feeling and whether you have nausea or not, you may wish to stay with a bland diet for the first few days. However, you can advance this as quickly as you feel ready.     2. ACTIVITIES: After discharge from the hospital, you may resume full routine activities. However, there should be no heavy lifting (greater than 15 pounds) and no strenuous activities until after your follow-up visit. Otherwise, routine activities of daily living are acceptable.     3. DRIVING: You may drive whenever you are off pain medications and are able to perform the activities needed to drive, i.e. turning, bending, twisting, etc.     4. BATHING: You may get the wound wet at any time after leaving the hospital. You may shower, but do not submerge in a bath for at least a week.      5. BOWEL FUNCTION: A few patients, after this operation, will develop either frequent or loose stools after meals. This usually corrects itself after a few days, to a few weeks. If this occurs, do not worry; it is not unusual and will resolve. Much more common than loose stools, is constipation. The combination of pain medication and decreased activity level can cause constipation in otherwise normal patients. If you feel this is occurring, take a laxative (Milk of Magnesia, Ex-Lax, Senokot, etc.) until the problem has resolved.     6. PAIN MEDICATION: You will be given a prescription for pain medication at discharge. Please take these as directed. It is important to remember not to take medications on an empty stomach as this may cause nausea.      It is also helpful to take other non-narcotic over the counter medications to help with pain control and to decrease the need for narcotic medications. I recommend ibuprofen, taken every 8 hours, dosing as directed on the medication bottle.     It is  useful to slowly decrease the number of narcotic pain medications you take starting the first day after surgery, as you are able. If you need a refill, Dr. Linares's office will provide you with one refill at your post-operative visit. After this, no more narcotic pain medications will be given.      7.CALL IF YOU HAVE: (1) Fevers to more than 1010 F, (2) Unusual chest or leg pain, (3) Drainage or fluid from incision that may be foul smelling, increased tenderness or soreness at the wound or the wound edges are no longer together, redness or swelling at the incision site. Please do not hesitate to call with any other questions.      8. APPOINTMENT: Contact our office at [phone] for a follow-up appointment in 1 to 2 weeks following your procedure.     If you have any additional questions, please do not hesitate to call the office and speak to either myself or the physician on call.     Office address:  [address] [place] [address] [id number]  [place] [id number]     Taamnna Linares M.D.  [facility]  [phone]        If any questions arise, call your provider.  If your provider is not available, please feel free to call the Surgical Center at [phone].    MEDICATIONS: Resume taking daily medication.  Take prescribed pain medication with food.  If no medication is prescribed, you may take non-aspirin pain medication if needed.  PAIN MEDICATION CAN BE VERY CONSTIPATING.  Take a stool softener or laxative such as senokot, pericolace, or milk of magnesia if needed.    Last pain medication given:     Tylenol and Celebrex (like Ibuprofen/Motrin) given at 11:40 AM, next dose of Tylenol and/or Ibuprofen can be taken after 5:40 PM (every 6 hours as needed).     Oxycodone given at 2:50 PM, next dose of Norco can be taken after 8:40 PM (every 6 hours as needed).    What to Expect Post Anesthesia    Rest and take it easy for the first 24 hours.  A responsible adult is recommended to remain with you during that time.  It is  normal to feel sleepy.  We encourage you to not do anything that requires balance, judgment or coordination.    FOR 24 HOURS DO NOT:  Drive, operate machinery or run household appliances.  Drink beer or alcoholic beverages.  Make important decisions or sign legal documents.    To avoid nausea, slowly advance diet as tolerated, avoiding spicy or greasy foods for the first day.  Add more substantial food to your diet according to your provider's instructions.  Babies can be fed formula or breast milk as soon as they are hungry.  INCREASE FLUIDS AND FIBER TO AVOID CONSTIPATION.    MILD FLU-LIKE SYMPTOMS ARE NORMAL.  YOU MAY EXPERIENCE GENERALIZED MUSCLE ACHES, THROAT IRRITATION, HEADACHE AND/OR SOME NAUSEA.

## 2023-11-29 NOTE — OR NURSING
1430 received patient from OR. Report from Anesthesiologist and OR RN. Patient on 6L at 98 % O2. VSS. Monitor connected. Oral airway in place. S/P Lap Cholecystectomy, incision x4 on abdomen covered with dermabond. Sites clean, dry and intact.    1439 Fentanyl given for pain.    1450 Oxycodone given. Patient tolerating oral fluids well.     1453 Subsequent dose of Fentanyl given.    1454 Zofran given for nausea.     1500 Subsequent dose of Fentanyl given.     1530 Attempted to notify  about patients status and plan of care. Left Voicemail to call back.    1540  at bedside.     1600 Discharge instructions covered with , verbalizes understanding. All questions answered at this time.     1625 Patient getting dressed with assistance from .     1640 Patient up to restroom, voided without complications.     1655 Medications picked up from [facility] by CCT.     1700 IV removed. Patient escorted out to responsible adult via wheelchair by RN. Belongings with patient, ambulated with steady gait. Discharge paperwork and instructions reviewed, signed, and sent with patient.

## 2023-11-29 NOTE — ANESTHESIA TIME REPORT
Anesthesia Start and Stop Event Times       Date Time Event    11/29/2023 1300 Ready for Procedure     1348 Anesthesia Start     1431 Anesthesia Stop          Responsible Staff  11/29/23      Name Role Begin End    Hermes Saini M.D. Anesth 1348 1431          Overtime Reason:  no overtime (within assigned shift)    Comments:

## 2023-11-30 NOTE — ANESTHESIA POSTPROCEDURE EVALUATION
Patient: Nubia Stapleton    Procedure Summary       Date: 11/29/23 Room / Location: [facility] ROOM 23 / SURGERY SAME DAY [facility]    Anesthesia Start: 1348 Anesthesia Stop: 1431    Procedure: LAPAROSCOPIC CHOLECYSTECTOMY (Abdomen) Diagnosis: (CHRONIC CHOLECYSTITIS)    Surgeons: Tamanna Lianres M.D. Responsible Provider: Hermes Saini M.D.    Anesthesia Type: general ASA Status: 2            Final Anesthesia Type: general  Last vitals  BP   Blood Pressure: 134/73    Temp   36.6 °C (97.8 °F)    Pulse   80   Resp   16    SpO2   96 %      Anesthesia Post Evaluation    Patient location during evaluation: PACU  Patient participation: complete - patient participated  Level of consciousness: awake and alert    Airway patency: patent  Anesthetic complications: no  Cardiovascular status: hemodynamically stable  Respiratory status: acceptable  Hydration status: euvolemic    PONV: none          No notable events documented.     Nurse Pain Score: 2 (NPRS)

## (undated) DEVICE — CANNULA W/SEAL 5X100 Z-THRE - ADED KII (12/BX)

## (undated) DEVICE — TUBING CLEARLINK DUO-VENT - C-FLO (48EA/CA)

## (undated) DEVICE — GOWN WARMING STANDARD FLEX - (30/CA)

## (undated) DEVICE — SYSTEM CLEARIFY VISUALIZATION (10EA/PK)

## (undated) DEVICE — SYRINGE 30 ML LL (56/BX)

## (undated) DEVICE — DERMABOND ADVANCED - (12EA/BX)

## (undated) DEVICE — TROCAR Z THREAD 11 X 100 - BLADED (6/BX)

## (undated) DEVICE — SET LEADWIRE 5 LEAD BEDSIDE DISPOSABLE ECG (1SET OF 5/EA)

## (undated) DEVICE — TUBE CONNECTING SUCTION - CLEAR PLASTIC STERILE 72 IN (50EA/CA)

## (undated) DEVICE — Device

## (undated) DEVICE — SUTURE 4-0 MONOCRYL PLUS PS-2 - 27 INCH (36/BX)

## (undated) DEVICE — SCISSORS 5MM CVD (6EA/BX)

## (undated) DEVICE — LACTATED RINGERS INJ 1000 ML - (14EA/CA 60CA/PF)

## (undated) DEVICE — TROCAR 5X100 BLADED Z-THREAD - KII (6/BX)

## (undated) DEVICE — SUTURE GENERAL

## (undated) DEVICE — SET SUCTION/IRRIGATION WITH DISPOSABLE TIP (6/CA )PART #0250-070-520 IS A SUB

## (undated) DEVICE — CANNULA O2 COMFORT SOFT EAR ADULT 7 FT TUBING (50/CA)

## (undated) DEVICE — TUBE E-T HI-LO CUFF 7.0MM (10EA/PK)

## (undated) DEVICE — SENSOR OXIMETER ADULT SPO2 RD SET (20EA/BX)

## (undated) DEVICE — MASK OXYGEN VNYL ADLT MED CONC WITH 7 FOOT TUBING  - (50EA/CA)

## (undated) DEVICE — SODIUM CHL IRRIGATION 0.9% 1000ML (12EA/CA)

## (undated) DEVICE — TOWEL STOP TIMEOUT SAFETY FLAG (40EA/CA)

## (undated) DEVICE — SUCTION INSTRUMENT YANKAUER BULBOUS TIP W/O VENT (50EA/CA)

## (undated) DEVICE — SLEEVE VASO CALF MED - (10PR/CA)

## (undated) DEVICE — GLOVE BIOGEL INDICATOR SZ 7SURGICAL PF LTX - (50/BX 4BX/CA)

## (undated) DEVICE — KIT  I.V. START (100EA/CA)

## (undated) DEVICE — NEEDLE INSUFFLATION FOR STEP - (12/BX)

## (undated) DEVICE — BAG RETRIEVAL 10ML (10EA/BX)

## (undated) DEVICE — CANISTER SUCTION RIGID RED 1500CC (40EA/CA)

## (undated) DEVICE — CANISTER SUCTION 3000ML MECHANICAL FILTER AUTO SHUTOFF MEDI-VAC NONSTERILE LF DISP  (40EA/CA)

## (undated) DEVICE — CLIP MED LG INTNL HRZN TI ESCP - (20/BX)